# Patient Record
Sex: FEMALE | Race: WHITE | NOT HISPANIC OR LATINO | Employment: OTHER | URBAN - METROPOLITAN AREA
[De-identification: names, ages, dates, MRNs, and addresses within clinical notes are randomized per-mention and may not be internally consistent; named-entity substitution may affect disease eponyms.]

---

## 2022-12-25 ENCOUNTER — OFFICE VISIT (OUTPATIENT)
Dept: URGENT CARE | Facility: URGENT CARE | Age: 77
End: 2022-12-25
Payer: COMMERCIAL

## 2022-12-25 VITALS
OXYGEN SATURATION: 97 % | TEMPERATURE: 97.8 F | SYSTOLIC BLOOD PRESSURE: 130 MMHG | DIASTOLIC BLOOD PRESSURE: 88 MMHG | WEIGHT: 192.6 LBS | HEART RATE: 86 BPM

## 2022-12-25 DIAGNOSIS — S61.215A LACERATION OF LEFT RING FINGER WITHOUT FOREIGN BODY WITHOUT DAMAGE TO NAIL, INITIAL ENCOUNTER: Primary | ICD-10-CM

## 2022-12-25 PROCEDURE — 12001 RPR S/N/AX/GEN/TRNK 2.5CM/<: CPT | Mod: F3 | Performed by: FAMILY MEDICINE

## 2022-12-25 PROCEDURE — 90471 IMMUNIZATION ADMIN: CPT | Performed by: FAMILY MEDICINE

## 2022-12-25 PROCEDURE — 90715 TDAP VACCINE 7 YRS/> IM: CPT | Performed by: FAMILY MEDICINE

## 2022-12-25 PROCEDURE — 99207 PR NO CHARGE LOS: CPT | Performed by: FAMILY MEDICINE

## 2022-12-25 NOTE — PROGRESS NOTES
Assessment & Plan     Laceration of left ring finger without foreign body without damage to nail, initial encounter  Up dated her Tdap.    Wound was locally injected with 2 cc's of Lidocaine 1% plain  Prepped and draped in the usual sterile fashion  Wound cleaned with betadine/saline solution  Wound soaked  4 staples used to close wound, used 4-0 Ethilon suture.  Patient tolerated well.  After care instructions:  Keep wound clean and dry for the next 24-48 hours  Sutures out in 7 days  - REPAIR SUPERFICIAL, WOUND BODY < =2.5CM    Patient Instructions   After care instructions:  Keep wound clean and dry for the next 24-48 hours  Signs of infection discussed today  Apply anti-bacterial ointment for 5- 7 days, 2-3X per day.  May return to work as long as wound is kept clean and dry  Discussed the probability of scarring  Follow up in 7 days to have stitches removed.        Social History     Socioeconomic History     Marital status: Single     Spouse name: Not on file     Number of children: Not on file     Years of education: Not on file     Highest education level: Not on file   Occupational History     Not on file   Tobacco Use     Smoking status: Not on file     Smokeless tobacco: Not on file   Substance and Sexual Activity     Alcohol use: Not on file     Drug use: Not on file     Sexual activity: Not on file   Other Topics Concern     Not on file   Social History Narrative     Not on file     Social Determinants of Health     Financial Resource Strain: Not on file   Food Insecurity: Not on file   Transportation Needs: Not on file   Physical Activity: Not on file   Stress: Not on file   Social Connections: Not on file   Intimate Partner Violence: Not on file   Housing Stability: Not on file       No current outpatient medications on file.       EXAM: The patient appears today in alert, oriented, no  distress  VITALS: Blood pressure 130/88, pulse 86, temperature 97.8  F (36.6  C), temperature source Tympanic,  weight 87.4 kg (192 lb 9.6 oz), SpO2 97 %.    Size of laceration: 2.5 centimeters  Characteristics of the laceration: bleeding- mild and clean  Tendon function intact: yes  Sensation to light touch intact: yes  Pulses intact: yes    Picture included in patient's chart: no    Return in about 1 week (around 1/1/2023) for Follow up.      Debbi Esquivel is a 77 year old presenting for the following health issues:  Laceration (Pt cut her left ring finger with a knife. Onset- Today (Sunday))        Alexi Painter MD

## 2022-12-25 NOTE — PATIENT INSTRUCTIONS
After care instructions:  Keep wound clean and dry for the next 24-48 hours  Signs of infection discussed today  Apply anti-bacterial ointment for 5- 7 days, 2-3X per day.  May return to work as long as wound is kept clean and dry  Discussed the probability of scarring  Follow up in 7 days to have stitches removed.